# Patient Record
Sex: FEMALE | Race: WHITE | NOT HISPANIC OR LATINO
[De-identification: names, ages, dates, MRNs, and addresses within clinical notes are randomized per-mention and may not be internally consistent; named-entity substitution may affect disease eponyms.]

---

## 2019-04-23 ENCOUNTER — TRANSCRIPTION ENCOUNTER (OUTPATIENT)
Age: 29
End: 2019-04-23

## 2019-04-23 ENCOUNTER — APPOINTMENT (OUTPATIENT)
Dept: INTERNAL MEDICINE | Facility: CLINIC | Age: 29
End: 2019-04-23
Payer: COMMERCIAL

## 2019-04-23 VITALS
BODY MASS INDEX: 21.56 KG/M2 | WEIGHT: 131 LBS | DIASTOLIC BLOOD PRESSURE: 80 MMHG | HEART RATE: 91 BPM | SYSTOLIC BLOOD PRESSURE: 122 MMHG | HEIGHT: 65.35 IN | OXYGEN SATURATION: 99 %

## 2019-04-23 DIAGNOSIS — Z00.00 ENCOUNTER FOR GENERAL ADULT MEDICAL EXAMINATION W/OUT ABNORMAL FINDINGS: ICD-10-CM

## 2019-04-23 DIAGNOSIS — Q60.0 RENAL AGENESIS, UNILATERAL: ICD-10-CM

## 2019-04-23 DIAGNOSIS — Q51.9 CONGENITAL MALFORMATION OF UTERUS AND CERVIX, UNSPECIFIED: ICD-10-CM

## 2019-04-23 DIAGNOSIS — Z80.7 FAMILY HISTORY OF OTHER MALIGNANT NEOPLASMS OF LYMPHOID, HEMATOPOIETIC AND RELATED TISSUES: ICD-10-CM

## 2019-04-23 PROCEDURE — 99385 PREV VISIT NEW AGE 18-39: CPT

## 2019-04-23 NOTE — PHYSICAL EXAM
[No Acute Distress] : no acute distress [Well Developed] : well developed [Well Nourished] : well nourished [Well-Appearing] : well-appearing [Normal Sclera/Conjunctiva] : normal sclera/conjunctiva [EOMI] : extraocular movements intact [PERRL] : pupils equal round and reactive to light [Normal Oropharynx] : the oropharynx was normal [Normal Outer Ear/Nose] : the outer ears and nose were normal in appearance [Supple] : supple [No Respiratory Distress] : no respiratory distress  [No Lymphadenopathy] : no lymphadenopathy [No Accessory Muscle Use] : no accessory muscle use [Clear to Auscultation] : lungs were clear to auscultation bilaterally [Normal S1, S2] : normal S1 and S2 [Regular Rhythm] : with a regular rhythm [Normal Rate] : normal rate  [Pedal Pulses Present] : the pedal pulses are present [No Edema] : there was no peripheral edema [Normal Appearance] : normal in appearance [No Masses] : no palpable masses [No Nipple Discharge] : no nipple discharge [No Axillary Lymphadenopathy] : no axillary lymphadenopathy [Soft] : abdomen soft [Non Tender] : non-tender [Non-distended] : non-distended [Normal Bowel Sounds] : normal bowel sounds [No Spinal Tenderness] : no spinal tenderness [No CVA Tenderness] : no CVA  tenderness [Grossly Normal Strength/Tone] : grossly normal strength/tone [Normal Gait] : normal gait [No Rash] : no rash [No Focal Deficits] : no focal deficits [Normal Affect] : the affect was normal [Normal Insight/Judgement] : insight and judgment were intact

## 2019-04-25 LAB
ALBUMIN SERPL ELPH-MCNC: 4.6 G/DL
ALP BLD-CCNC: 48 U/L
ALT SERPL-CCNC: 20 U/L
ANION GAP SERPL CALC-SCNC: 10 MMOL/L
AST SERPL-CCNC: 16 U/L
BASOPHILS # BLD AUTO: 0.04 K/UL
BASOPHILS NFR BLD AUTO: 0.8 %
BILIRUB SERPL-MCNC: 0.6 MG/DL
BUN SERPL-MCNC: 13 MG/DL
CALCIUM SERPL-MCNC: 9.6 MG/DL
CHLORIDE SERPL-SCNC: 105 MMOL/L
CO2 SERPL-SCNC: 25 MMOL/L
CREAT SERPL-MCNC: 0.68 MG/DL
EOSINOPHIL # BLD AUTO: 0.04 K/UL
EOSINOPHIL NFR BLD AUTO: 0.8 %
GLUCOSE SERPL-MCNC: 90 MG/DL
HCT VFR BLD CALC: 44.2 %
HGB BLD-MCNC: 14.1 G/DL
IMM GRANULOCYTES NFR BLD AUTO: 0.2 %
LYMPHOCYTES # BLD AUTO: 2.35 K/UL
LYMPHOCYTES NFR BLD AUTO: 49.3 %
MAN DIFF?: NORMAL
MCHC RBC-ENTMCNC: 29.1 PG
MCHC RBC-ENTMCNC: 31.9 GM/DL
MCV RBC AUTO: 91.3 FL
MONOCYTES # BLD AUTO: 0.31 K/UL
MONOCYTES NFR BLD AUTO: 6.5 %
NEUTROPHILS # BLD AUTO: 2.02 K/UL
NEUTROPHILS NFR BLD AUTO: 42.4 %
PLATELET # BLD AUTO: 282 K/UL
POTASSIUM SERPL-SCNC: 4.5 MMOL/L
PROT SERPL-MCNC: 7.2 G/DL
RBC # BLD: 4.84 M/UL
RBC # FLD: 12.7 %
SODIUM SERPL-SCNC: 140 MMOL/L
WBC # FLD AUTO: 4.77 K/UL

## 2019-04-25 NOTE — ASSESSMENT
[FreeTextEntry1] : 28yoF presents to establish care and for CPE.\par \par 1. HCM\par -Patient will check status of Tdap\par -Recommended flu shot annually for patient\par -CBC, CMP today\par -Last pap in [facility] 2/2016 - due for pap smear with GYN - will make return appointment (follows with Dr. Brown regularly for uterine anomaly, dysmenorrhea as well)\par \par 2. Renal agenesis\par -Last renal US 6/2016 showing single hypertrophied right kidney\par -Will check renal function today

## 2019-04-25 NOTE — REVIEW OF SYSTEMS
[Fever] : no fever [Chills] : no chills [Chest Pain] : no chest pain [Shortness Of Breath] : no shortness of breath [Abdominal Pain] : no abdominal pain [Hematuria] : no hematuria [Dysuria] : no dysuria [Frequency] : no frequency

## 2019-04-25 NOTE — HISTORY OF PRESENT ILLNESS
[de-identified] : 28yoF presents for new patient evaluation and CPE.\par \par Patient follows regularly with Dr. Don Brown - last saw about one year ago. 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 This was found in 2010 when she was having menstrual issues - she then had removal of the rudimentary left horn and left salpingectomy. She feels that she may have seen a urologist or nephrologist years ago regarding the single kidney but has not seen anyone recently. She last had renal imaging in our system in June 2016 showing single hypertrophied right kidney (15 x 5.1 x 5.9 cm), no evidence of hydronephrosis, no cysts, no solid lesions.

## 2019-04-25 NOTE — HEALTH RISK ASSESSMENT
[0] : 1) Little interest or pleasure doing things: Not at all (0) [With Significant Other] : lives with significant other [Employed] : employed [] :  [Sexually Active] : sexually active [Fully functional (bathing, dressing, toileting, transferring, walking, feeding)] : Fully functional (bathing, dressing, toileting, transferring, walking, feeding) [Fully functional (using the telephone, shopping, preparing meals, housekeeping, doing laundry, using] : Fully functional and needs no help or supervision to perform IADLs (using the telephone, shopping, preparing meals, housekeeping, doing laundry, using transportation, managing medications and managing finances) [Smoke Detector] : smoke detector [Carbon Monoxide Detector] : carbon monoxide detector [Seat Belt] :  uses seat belt [Safety elements used in home] : safety elements used in home [Patient reported PAP Smear was normal] : Patient reported PAP Smear was normal [] : No [de-identified] : GYN [de-identified] : social EtOH occasionally [BEY1Ovyby] : 0 [Reports changes in dental health] : Reports no changes in dental health [Reports changes in vision] : Reports no changes in vision [PapSmearDate] : 2/2016 [de-identified] : using birth control [FreeTextEntry2] : teacher [de-identified] : recent eye exam [de-identified] : recent dentist visit

## 2019-04-26 ENCOUNTER — TRANSCRIPTION ENCOUNTER (OUTPATIENT)
Age: 29
End: 2019-04-26

## 2024-10-01 ENCOUNTER — INPATIENT (INPATIENT)
Facility: HOSPITAL | Age: 34
LOS: 1 days | Discharge: HOME | DRG: 951 | End: 2024-10-03
Attending: OBSTETRICS & GYNECOLOGY | Admitting: OBSTETRICS & GYNECOLOGY
Payer: COMMERCIAL

## 2024-10-01 VITALS
SYSTOLIC BLOOD PRESSURE: 135 MMHG | RESPIRATION RATE: 17 BRPM | DIASTOLIC BLOOD PRESSURE: 86 MMHG | OXYGEN SATURATION: 99 % | TEMPERATURE: 99 F | HEART RATE: 122 BPM

## 2024-10-01 DIAGNOSIS — O26.899 OTHER SPECIFIED PREGNANCY RELATED CONDITIONS, UNSPECIFIED TRIMESTER: ICD-10-CM

## 2024-10-01 DIAGNOSIS — Z34.80 ENCOUNTER FOR SUPERVISION OF OTHER NORMAL PREGNANCY, UNSPECIFIED TRIMESTER: ICD-10-CM

## 2024-10-01 LAB
BASOPHILS # BLD AUTO: 0.05 K/UL — SIGNIFICANT CHANGE UP (ref 0–0.2)
BASOPHILS NFR BLD AUTO: 0.4 % — SIGNIFICANT CHANGE UP (ref 0–2)
BLD GP AB SCN SERPL QL: NEGATIVE — SIGNIFICANT CHANGE UP
EOSINOPHIL # BLD AUTO: 0.08 K/UL — SIGNIFICANT CHANGE UP (ref 0–0.5)
EOSINOPHIL NFR BLD AUTO: 0.7 % — SIGNIFICANT CHANGE UP (ref 0–6)
HCT VFR BLD CALC: 35.8 % — SIGNIFICANT CHANGE UP (ref 34.5–45)
HGB BLD-MCNC: 11.4 G/DL — LOW (ref 11.5–15.5)
IMM GRANULOCYTES NFR BLD AUTO: 1.3 % — HIGH (ref 0–0.9)
LYMPHOCYTES # BLD AUTO: 1.77 K/UL — SIGNIFICANT CHANGE UP (ref 1–3.3)
LYMPHOCYTES # BLD AUTO: 15.4 % — SIGNIFICANT CHANGE UP (ref 13–44)
MCHC RBC-ENTMCNC: 27.3 PG — SIGNIFICANT CHANGE UP (ref 27–34)
MCHC RBC-ENTMCNC: 31.8 GM/DL — LOW (ref 32–36)
MCV RBC AUTO: 85.6 FL — SIGNIFICANT CHANGE UP (ref 80–100)
MONOCYTES # BLD AUTO: 0.74 K/UL — SIGNIFICANT CHANGE UP (ref 0–0.9)
MONOCYTES NFR BLD AUTO: 6.4 % — SIGNIFICANT CHANGE UP (ref 2–14)
NEUTROPHILS # BLD AUTO: 8.7 K/UL — HIGH (ref 1.8–7.4)
NEUTROPHILS NFR BLD AUTO: 75.8 % — SIGNIFICANT CHANGE UP (ref 43–77)
NRBC # BLD: 0 /100 WBCS — SIGNIFICANT CHANGE UP (ref 0–0)
PLATELET # BLD AUTO: 310 K/UL — SIGNIFICANT CHANGE UP (ref 150–400)
RBC # BLD: 4.18 M/UL — SIGNIFICANT CHANGE UP (ref 3.8–5.2)
RBC # FLD: 13.8 % — SIGNIFICANT CHANGE UP (ref 10.3–14.5)
RH IG SCN BLD-IMP: POSITIVE — SIGNIFICANT CHANGE UP
RH IG SCN BLD-IMP: POSITIVE — SIGNIFICANT CHANGE UP
WBC # BLD: 11.49 K/UL — HIGH (ref 3.8–10.5)
WBC # FLD AUTO: 11.49 K/UL — HIGH (ref 3.8–10.5)

## 2024-10-01 RX ORDER — OXYCODONE HYDROCHLORIDE 30 MG/1
5 TABLET, FILM COATED, EXTENDED RELEASE ORAL
Refills: 0 | Status: DISCONTINUED | OUTPATIENT
Start: 2024-10-01 | End: 2024-10-03

## 2024-10-01 RX ORDER — SODIUM CHLORIDE IRRIG SOLUTION 0.9 %
1000 SOLUTION, IRRIGATION IRRIGATION
Refills: 0 | Status: DISCONTINUED | OUTPATIENT
Start: 2024-10-01 | End: 2024-10-02

## 2024-10-01 RX ORDER — OXYTOCIN/RINGER'S LACTATE 20/500ML
167 PLASTIC BAG, INJECTION (ML) INTRAVENOUS
Qty: 30 | Refills: 0 | Status: DISCONTINUED | OUTPATIENT
Start: 2024-10-01 | End: 2024-10-03

## 2024-10-01 RX ORDER — KETOROLAC TROMETHAMINE 10 MG/1
30 TABLET, FILM COATED ORAL ONCE
Refills: 0 | Status: DISCONTINUED | OUTPATIENT
Start: 2024-10-01 | End: 2024-10-01

## 2024-10-01 RX ORDER — ACETAMINOPHEN 325 MG
975 TABLET ORAL
Refills: 0 | Status: DISCONTINUED | OUTPATIENT
Start: 2024-10-01 | End: 2024-10-03

## 2024-10-01 RX ORDER — PRENATAL VIT,CAL 76/IRON/FOLIC 29 MG-1 MG
1 TABLET ORAL DAILY
Refills: 0 | Status: DISCONTINUED | OUTPATIENT
Start: 2024-10-01 | End: 2024-10-03

## 2024-10-01 RX ORDER — DIBUCAINE 1 %
1 OINTMENT (GRAM) TOPICAL EVERY 6 HOURS
Refills: 0 | Status: DISCONTINUED | OUTPATIENT
Start: 2024-10-01 | End: 2024-10-03

## 2024-10-01 RX ORDER — CHLORHEXIDINE GLUCONATE ORAL RINSE 1.2 MG/ML
1 SOLUTION DENTAL DAILY
Refills: 0 | Status: DISCONTINUED | OUTPATIENT
Start: 2024-10-01 | End: 2024-10-02

## 2024-10-01 RX ORDER — SODIUM CITRATE AND CITRIC ACID MONOHYDRATE 334; 500 MG/5ML; MG/5ML
15 SOLUTION ORAL EVERY 6 HOURS
Refills: 0 | Status: DISCONTINUED | OUTPATIENT
Start: 2024-10-01 | End: 2024-10-02

## 2024-10-01 RX ORDER — DIPHENHYDRAMINE HCL 12.5MG/5ML
25 LIQUID (ML) ORAL EVERY 6 HOURS
Refills: 0 | Status: DISCONTINUED | OUTPATIENT
Start: 2024-10-01 | End: 2024-10-03

## 2024-10-01 RX ORDER — SODIUM CHLORIDE 0.9 % (FLUSH) 0.9 %
3 SYRINGE (ML) INJECTION EVERY 8 HOURS
Refills: 0 | Status: DISCONTINUED | OUTPATIENT
Start: 2024-10-01 | End: 2024-10-03

## 2024-10-01 RX ORDER — SOAP/LANOLIN
1 BAR TOPICAL EVERY 4 HOURS
Refills: 0 | Status: DISCONTINUED | OUTPATIENT
Start: 2024-10-01 | End: 2024-10-03

## 2024-10-01 RX ORDER — MAGNESIUM HYDROXIDE 400 MG/5ML
30 SUSPENSION, ORAL (FINAL DOSE FORM) ORAL
Refills: 0 | Status: DISCONTINUED | OUTPATIENT
Start: 2024-10-01 | End: 2024-10-03

## 2024-10-01 RX ORDER — PRAMOXINE HYDROCHLORIDE 10 MG/ML
1 LOTION TOPICAL EVERY 4 HOURS
Refills: 0 | Status: DISCONTINUED | OUTPATIENT
Start: 2024-10-01 | End: 2024-10-03

## 2024-10-01 RX ORDER — ANTI-ITCH CREAM 1 G/100G
1 OINTMENT TOPICAL EVERY 6 HOURS
Refills: 0 | Status: DISCONTINUED | OUTPATIENT
Start: 2024-10-01 | End: 2024-10-03

## 2024-10-01 RX ORDER — OXYCODONE HYDROCHLORIDE 30 MG/1
5 TABLET, FILM COATED, EXTENDED RELEASE ORAL ONCE
Refills: 0 | Status: DISCONTINUED | OUTPATIENT
Start: 2024-10-01 | End: 2024-10-03

## 2024-10-01 RX ORDER — TETANUS TOXOID, REDUCED DIPHTHERIA TOXOID AND ACELLULAR PERTUSSIS VACCINE, ADSORBED 5; 2.5; 8; 8; 2.5 [IU]/.5ML; [IU]/.5ML; UG/.5ML; UG/.5ML; UG/.5ML
0.5 SUSPENSION INTRAMUSCULAR ONCE
Refills: 0 | Status: DISCONTINUED | OUTPATIENT
Start: 2024-10-01 | End: 2024-10-03

## 2024-10-01 RX ORDER — OXYTOCIN/RINGER'S LACTATE 20/500ML
4 PLASTIC BAG, INJECTION (ML) INTRAVENOUS
Qty: 30 | Refills: 0 | Status: DISCONTINUED | OUTPATIENT
Start: 2024-10-01 | End: 2024-10-02

## 2024-10-01 RX ADMIN — Medication 167 MILLIUNIT(S)/MIN: at 21:50

## 2024-10-01 RX ADMIN — Medication 125 MILLILITER(S): at 18:00

## 2024-10-01 RX ADMIN — KETOROLAC TROMETHAMINE 30 MILLIGRAM(S): 10 TABLET, FILM COATED ORAL at 22:54

## 2024-10-01 NOTE — OB PROVIDER H&P - PROBLEM SELECTOR PLAN 1
Admit to L&D  NPO  IVF  BR  EFM/TOCO  Anesthesia consult  Routine labs  Pitocin augmentation prn   VE in 1 hr.  Epidural prn  Anticipate vaginal delivery    ALAYNA Costello  D/W Dr. Amezcua

## 2024-10-01 NOTE — OB PROVIDER H&P - HISTORY OF PRESENT ILLNESS
31 y.o. Female  EDC 10/14/24 IUP @ 38 1/7 wks. gest., (-) GBS, c/o srom (clear) @ 3:24 pm. with irregular ctx deferring epidural at this time.  (+) FM.  (-) Vaginal Bleeding.    EFW 3200 gms.    POBHx:    @ 38 wks. gest., 7#, manual extaction of placenta    PGynHx:  Bicornuate Uterus Dx'ed on sono  performed for dysmenorrhea with rudimentory left horn, s/p excision with left salpingectomy    PMHx:  Agenesis left kidney dx'ed on sono     PSHx:   LSC Excision Left Uterine Horn & Left Salpingectomy    Meds:  PNV 1 p.o. daily    NKDA    SocHx; Denies smoking tobacco/ETOH/Illegal drug use    FHx:  Breast CA          M-Lymphoma

## 2024-10-01 NOTE — OB PROVIDER LABOR PROGRESS NOTE - ASSESSMENT
32 yo  @38w1d admitted in labor, SROM@330pm.    - GBS neg  - EFM/TOCO: Resuscitative measures PRN  - Epi in place; patient with minimal relief, to call anesthesia if pain does not improve  - Patient amenable to Pit at this time  - Expect     Audelia Pete PA-C

## 2024-10-01 NOTE — OB RN DELIVERY SUMMARY - NS_SEPSISRSKCALC_OBGYN_ALL_OB_FT
EOS calculated successfully. EOS Risk Factor: 0.5/1000 live births ([facility] national incidence); GA=38w1d; Temp=99.14; ROM=6.367; GBS='Negative'; Antibiotics='No antibiotics or any antibiotics < 2 hrs prior to birth'

## 2024-10-01 NOTE — OB RN PATIENT PROFILE - NS_SUPPORTPERSONNAME_OBGYN_ALL_OB_FT
Monitor: The problem is newly identified.  Evaluation: Labs/tests ordered, see encounter summary.  Assessment/Treatment:  Start magnesium nightly. Consider low FODMAP diet, KUB in the near future if no improvement.   If worsening symptoms, alert provider.     Gaetano Saladino

## 2024-10-01 NOTE — OB RN PATIENT PROFILE - NSSDOHPUBLICBEN_OBGYN_A_OB

## 2024-10-01 NOTE — OB RN PATIENT PROFILE - PRO FEEDING PLAN INFANT OB
Bleeding that does not stop/Pain not relieved by Medications/Fever greater than (need to indicate Fahrenheit or Celsius)/Nausea and vomiting that does not stop/Excessive diarrhea/Inability to tolerate liquids or foods initiation of breastfeeding/breast milk feeding

## 2024-10-01 NOTE — OB PROVIDER H&P - ASSESSMENT
31 y.o. Female  EDC 10/14/24 IUP @ 38 1/ wks. gest., (-) GBS, c/o srom (clear) @ 3:24 pm. with irregular ctx deferring epidural at this time.  (+) FM.  (-) Vaginal Bleeding.    EFW 3200 gms.

## 2024-10-01 NOTE — OB RN DELIVERY SUMMARY - NS_SEROLOGYDONE_OBGYN_ALL_OB
Patient position prone. Patient prepped and draped per unit standard.    Safety straps applied:No: on cart with side rail up   Yes

## 2024-10-01 NOTE — OB RN DELIVERY SUMMARY - NSSELHIDDEN_OBGYN_ALL_OB_FT
[NS_DeliveryAttending1_OBGYN_ALL_OB_FT:[id number]==],[NS_DeliveryRN_OBGYN_ALL_OB_FT:[id number]=]

## 2024-10-01 NOTE — OB PROVIDER LABOR PROGRESS NOTE - NS_SUBJECTIVE/OBJECTIVE_OBGYN_ALL_OB_FT
Patient seen and evaluated at bedside for increasing rectal pressure after the epidural. Patient reports intense vaginal rectal pressure with contractions.    ICU Vital Signs Last 24 Hrs  T(C): 37 (01 Oct 2024 19:28), Max: 37.0 (01 Oct 2024 17:26)  T(F): 98.6 (01 Oct 2024 17:41), Max: 98.6 (01 Oct 2024 17:26)  HR: 106 (01 Oct 2024 20:08) (96 - 125)  BP: 128/69 (01 Oct 2024 20:06) (117/69 - 147/78)  RR: 17 (01 Oct 2024 19:28) (17 - 17)  SpO2: 99% (01 Oct 2024 20:08) (97% - 100%)    O2 Parameters below as of 01 Oct 2024 17:41  Patient On (Oxygen Delivery Method): room air
Patient seen and evaluated at bedside for VE.

## 2024-10-01 NOTE — OB PROVIDER H&P - NS_OBGYNHISTORY_OBGYN_ALL_OB_FT
POBHx:    @ 38 wks. gest., 7#, manual extaction of placenta    PGynHx:  Bicornuate Uterus Dx'ed on sono  performed for dysmenorrhea with rudimentory left horn, s/p excision with left salpingectomy

## 2024-10-01 NOTE — PRE-ANESTHESIA EVALUATION ADULT - HEIGHT IN INCHES
At 0950 RN went into room and found mom asleep in recliner, holding baby. RN touched mom to rouse her and explained to mom that it is not safe for baby to be in mom's arms while mom is sleeping.  Mom stated that she was not asleep but that she was tired.  RN encouraged mom to nap after putting baby in the crib. Mom stated that she wanted to continue to hold baby and that she would remain awake.   5

## 2024-10-01 NOTE — OB PROVIDER LABOR PROGRESS NOTE - ASSESSMENT
32 yo  @38w1d admitted in labor, SROM@330pm.    - GBS neg  - EFM/TOCO: Resuscitative measures PRN  - Epi PRN  - Patient currently still refusing pitocin, patient educated on benefits in labor with current contraction pattern and ruptured membranes.  - Expect     Audelia Pete PA-C   32 yo  @38w1d admitted in labor, SROM@330pm.    - GBS neg  - EFM/TOCO: Resuscitative measures PRN  - Epi called at this time  - Patient currently still refusing pitocin, patient educated on benefits in labor with current contraction pattern and ruptured membranes.  - Expect     Audelia Pete PA-C

## 2024-10-01 NOTE — OB RN PATIENT PROFILE - NS_RSVCONTRAINDICATIONSTOIMMUNIZE_OBGYN_ALL_OB
Patient is not between 32-36 6/7 weeks gestation/Patient expected to deliver during this encouter/Patient refuses

## 2024-10-02 LAB — T PALLIDUM AB TITR SER: NEGATIVE — SIGNIFICANT CHANGE UP

## 2024-10-02 RX ADMIN — Medication 600 MILLIGRAM(S): at 23:43

## 2024-10-02 RX ADMIN — Medication 600 MILLIGRAM(S): at 06:51

## 2024-10-02 RX ADMIN — Medication 975 MILLIGRAM(S): at 20:39

## 2024-10-02 RX ADMIN — Medication 600 MILLIGRAM(S): at 06:22

## 2024-10-02 RX ADMIN — Medication 975 MILLIGRAM(S): at 21:15

## 2024-10-02 RX ADMIN — Medication 975 MILLIGRAM(S): at 03:51

## 2024-10-02 RX ADMIN — Medication 975 MILLIGRAM(S): at 03:21

## 2024-10-02 RX ADMIN — Medication 600 MILLIGRAM(S): at 17:47

## 2024-10-03 ENCOUNTER — TRANSCRIPTION ENCOUNTER (OUTPATIENT)
Age: 34
End: 2024-10-03

## 2024-10-03 VITALS
SYSTOLIC BLOOD PRESSURE: 106 MMHG | RESPIRATION RATE: 18 BRPM | OXYGEN SATURATION: 96 % | DIASTOLIC BLOOD PRESSURE: 64 MMHG | TEMPERATURE: 98 F | HEART RATE: 82 BPM

## 2024-10-03 PROCEDURE — 59050 FETAL MONITOR W/REPORT: CPT

## 2024-10-03 PROCEDURE — 86850 RBC ANTIBODY SCREEN: CPT

## 2024-10-03 PROCEDURE — 85025 COMPLETE CBC W/AUTO DIFF WBC: CPT

## 2024-10-03 PROCEDURE — 86900 BLOOD TYPING SEROLOGIC ABO: CPT

## 2024-10-03 PROCEDURE — 86780 TREPONEMA PALLIDUM: CPT

## 2024-10-03 PROCEDURE — 86901 BLOOD TYPING SEROLOGIC RH(D): CPT

## 2024-10-03 RX ORDER — ACETAMINOPHEN 325 MG
3 TABLET ORAL
Qty: 0 | Refills: 0 | DISCHARGE
Start: 2024-10-03

## 2024-10-03 RX ORDER — PRENATAL VIT,CAL 76/IRON/FOLIC 29 MG-1 MG
1 TABLET ORAL
Qty: 0 | Refills: 0 | DISCHARGE
Start: 2024-10-03

## 2024-10-03 RX ADMIN — Medication 975 MILLIGRAM(S): at 04:35

## 2024-10-03 RX ADMIN — Medication 600 MILLIGRAM(S): at 00:15

## 2024-10-03 RX ADMIN — Medication 600 MILLIGRAM(S): at 12:05

## 2024-10-03 RX ADMIN — Medication 600 MILLIGRAM(S): at 06:34

## 2024-10-03 RX ADMIN — Medication 600 MILLIGRAM(S): at 13:00

## 2024-10-03 RX ADMIN — Medication 975 MILLIGRAM(S): at 03:59

## 2024-10-03 NOTE — DISCHARGE NOTE OB - MEDICATION SUMMARY - MEDICATIONS TO TAKE
I will START or STAY ON the medications listed below when I get home from the hospital:    ibuprofen 600 mg oral tablet  -- 1 tab(s) by mouth every 6 hours  -- Indication: For Pain    acetaminophen 325 mg oral tablet  -- 3 tab(s) by mouth every 6 hours  -- Indication: For Pain    Prenatal Multivitamins with Folic Acid 1 mg oral tablet  -- 1 tab(s) by mouth once a day  -- Indication: For Health

## 2024-10-03 NOTE — DISCHARGE NOTE OB - AVOID PROLONGED STANDING
No care due was identified.  [facility] Embedded Care Due Messages. Reference number: [id number].   7/05/2024 11:36:15 AM CDT   Statement Selected

## 2024-10-03 NOTE — DISCHARGE NOTE OB - CARE PROVIDER_API CALL
Miguel Amezcua  Obstetrics and Gynecology  [address], [facility]  [place] [address]  Phone: [phone]  Fax: [phone]  Follow Up Time:

## 2024-10-03 NOTE — PROGRESS NOTE ADULT - SUBJECTIVE AND OBJECTIVE BOX
Pt evaluated at bedside. She is feeling well without complaints. Denies HA, lightheadedness, dizziness, CP, SOB, palpitations, heavy vaginal bleeding, abdominal pain.     T(C): 36.7 (10-03-24 @ 05:49), Max: 36.7 (10-02-24 @ 21:05)  HR: 82 (10-03-24 @ 05:49) (82 - 100)  BP: 106/64 (10-03-24 @ 05:49) (106/64 - 123/72)  RR: 18 (10-03-24 @ 05:49) (18 - 18)  SpO2: 96% (10-03-24 @ 05:49) (96% - 99%)    Physical exam:  Abd: Soft, NT, ND, Fundus firm  VE: Appropriate vaginal bleeding.    
Postpartum Note- PPD#1    Prenatal Labs  Blood type: A Positive  Rubella IgG: Immune  RPR: Negative        S:Patient w/o complaints, pain is controlled.    Pt is OOB, tolerating PO, voiding. Lochia WNL.     O:  Vital Signs Last 24 Hrs  T(C): 36.8 (02 Oct 2024 06:05), Max: 37.3 (01 Oct 2024 20:15)  T(F): 98.3 (02 Oct 2024 06:05), Max: 99.14 (01 Oct 2024 20:15)  HR: 98 (02 Oct 2024 06:05) (93 - 134)  BP: 116/75 (02 Oct 2024 06:05) (104/74 - 147/78)  BP(mean): 85 (02 Oct 2024 00:15) (80 - 91)  RR: 18 (02 Oct 2024 06:05) (16 - 18)  SpO2: 98% (02 Oct 2024 06:05) (92% - 100%)    Parameters below as of 02 Oct 2024 06:05  Patient On (Oxygen Delivery Method): room air         Gen: NAD  Abdomen: Soft, nontender, non-distended, fundus firm.  Vaginal: Lochia WNL,    Ext:  Neg calf tenderness    LABS:    Hemoglobin: 11.4 g/dL (10-01 @ 18:53)      Hematocrit: 35.8 % (10-01 @ 18:53)

## 2024-10-03 NOTE — DISCHARGE NOTE OB - PLAN OF CARE
After discharge, please stay on pelvic rest for 6 weeks, meaning no sexual intercourse, no tampons and no douching.  No driving for 2 weeks as women can loose a lot of blood during delivery and there is a possibility of being lightheaded/fainting.  No lifting objects heavier than baby for two weeks.  Expect to have vaginal bleeding/spotting for up to six weeks.  The bleeding should get lighter and more white/light brown with time.  For bleeding soaking more than a pad an hour or passing clots greater than the size of your fist, come in to the emergency department.    Follow up in the office in 6 weeks     You can take up to 600mg Ibuprofen every 6 hours and/or tylenol 1000mg every 6 hours. Alternate medications every 3 hours (Take Ibuprofen in the morning and then tylenol 3 hours later)

## 2024-10-03 NOTE — PROGRESS NOTE ADULT - ASSESSMENT
A/P:  33y  PPD # 1   S/P     Doing Well    PMHx: denies  Current Issues: denies      
Plan:   Continue routine post partum care  Tylenol/Motrin for pain control       Jazmín

## 2024-10-03 NOTE — DISCHARGE NOTE OB - PATIENT PORTAL LINK FT
You can access the FollowMyHealth Patient Portal offered by [facility] by registering at the following website: [email or web address]. By joining [employer]’s FollowMyHealth portal, you will also be able to view your health information using other applications (apps) compatible with our system.

## 2024-10-03 NOTE — DISCHARGE NOTE OB - CARE PLAN
Principal Discharge DX:	 (normal spontaneous vaginal delivery)  Assessment and plan of treatment:	After discharge, please stay on pelvic rest for 6 weeks, meaning no sexual intercourse, no tampons and no douching.  No driving for 2 weeks as women can loose a lot of blood during delivery and there is a possibility of being lightheaded/fainting.  No lifting objects heavier than baby for two weeks.  Expect to have vaginal bleeding/spotting for up to six weeks.  The bleeding should get lighter and more white/light brown with time.  For bleeding soaking more than a pad an hour or passing clots greater than the size of your fist, come in to the emergency department.    Follow up in the office in 6 weeks     You can take up to 600mg Ibuprofen every 6 hours and/or tylenol 1000mg every 6 hours. Alternate medications every 3 hours (Take Ibuprofen in the morning and then tylenol 3 hours later)   1

## 2025-02-02 NOTE — PROGRESS NOTE ADULT - NS ATTEND AMEND GEN_ALL_CORE FT
Pt evaluated at bedside. She is feeling well without complaints. Denies HA, lightheadedness, dizziness, CP, SOB, palpitations, heavy vaginal bleeding, abdominal pain.     T(C): 36.8 (10-02-24 @ 06:05), Max: 37.3 (10-01-24 @ 20:15)  HR: 98 (10-02-24 @ 06:05) (93 - 134)  BP: 116/75 (10-02-24 @ 06:05) (104/74 - 147/78)  RR: 18 (10-02-24 @ 06:05) (16 - 18)  SpO2: 98% (10-02-24 @ 06:05) (92% - 100%)    Physical exam:  Abd: Soft, NT, ND, Fundus firm  VE: Appropriate vaginal bleeding.    Plan:   Continue routine post partum care  Tylenol/Motrin for pain control  Agree with above POC    Jazmín oriented to person, place and time